# Patient Record
Sex: FEMALE | Race: WHITE
[De-identification: names, ages, dates, MRNs, and addresses within clinical notes are randomized per-mention and may not be internally consistent; named-entity substitution may affect disease eponyms.]

---

## 2020-10-18 ENCOUNTER — HOSPITAL ENCOUNTER (EMERGENCY)
Dept: HOSPITAL 26 - MED | Age: 45
Discharge: HOME | End: 2020-10-18
Payer: MEDICAID

## 2020-10-18 VITALS — WEIGHT: 120 LBS | HEIGHT: 63 IN | BODY MASS INDEX: 21.26 KG/M2

## 2020-10-18 VITALS — DIASTOLIC BLOOD PRESSURE: 61 MMHG | SYSTOLIC BLOOD PRESSURE: 110 MMHG

## 2020-10-18 VITALS — SYSTOLIC BLOOD PRESSURE: 110 MMHG | DIASTOLIC BLOOD PRESSURE: 61 MMHG
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
## 2020-10-18 DIAGNOSIS — S01.81XA: Primary | ICD-10-CM
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
## 2020-10-18 DIAGNOSIS — Z98.890: ICD-10-CM
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
## 2020-10-18 PROCEDURE — 12011 RPR F/E/E/N/L/M 2.5 CM/<: CPT

## 2020-10-18 PROCEDURE — 99284 EMERGENCY DEPT VISIT MOD MDM: CPT

## 2020-10-18 PROCEDURE — 90715 TDAP VACCINE 7 YRS/> IM: CPT

## 2020-10-18 PROCEDURE — 90471 IMMUNIZATION ADMIN: CPT

## 2020-10-18 NOTE — NUR
44 YEAR OLD FEMALE COMPLAINS OF LACERATION TO FOREHEAD X 1 HOUR AGO. PT STATES 
THAT SHE SLIPPED AND FELL IN SHOWER, DENIES LOC, DENIES NAUSEA/VOMIT, DENIES 
CHANGE IN VISION. PT DOES NOT REMEMBER LAST TDAP. PT AOX4, BREATHING EVEN AND 
UNLABORED, SKIN WARM AND DRY. BED IN LOWEST POSITION, LOCKED, BED RAIL UPX1.



PMH - SCOLIOSIS

ALLERGIES - NKA
